# Patient Record
Sex: MALE | Race: BLACK OR AFRICAN AMERICAN | NOT HISPANIC OR LATINO | URBAN - METROPOLITAN AREA
[De-identification: names, ages, dates, MRNs, and addresses within clinical notes are randomized per-mention and may not be internally consistent; named-entity substitution may affect disease eponyms.]

---

## 2024-04-12 ENCOUNTER — HOSPITAL ENCOUNTER (OUTPATIENT)
Dept: RADIOLOGY | Facility: HOSPITAL | Age: 64
Discharge: HOME | End: 2024-04-12
Payer: COMMERCIAL

## 2024-04-12 DIAGNOSIS — M50.31 OTHER CERVICAL DISC DEGENERATION, HIGH CERVICAL REGION: ICD-10-CM

## 2024-04-12 DIAGNOSIS — M48.02 SPINAL STENOSIS, CERVICAL REGION: ICD-10-CM

## 2024-04-12 DIAGNOSIS — M54.81 OCCIPITAL NEURALGIA: ICD-10-CM

## 2024-04-12 PROCEDURE — 72141 MRI NECK SPINE W/O DYE: CPT

## 2024-04-12 PROCEDURE — 72141 MRI NECK SPINE W/O DYE: CPT | Performed by: RADIOLOGY

## 2024-07-29 ENCOUNTER — APPOINTMENT (OUTPATIENT)
Dept: NEUROSURGERY | Facility: CLINIC | Age: 64
End: 2024-07-29
Payer: COMMERCIAL

## 2024-07-29 VITALS
DIASTOLIC BLOOD PRESSURE: 84 MMHG | TEMPERATURE: 97.5 F | SYSTOLIC BLOOD PRESSURE: 151 MMHG | BODY MASS INDEX: 31.67 KG/M2 | HEART RATE: 71 BPM | WEIGHT: 213.85 LBS | HEIGHT: 69 IN

## 2024-07-29 DIAGNOSIS — M79.602 LEFT ARM PAIN: Primary | ICD-10-CM

## 2024-07-29 ASSESSMENT — ENCOUNTER SYMPTOMS
CONSTIPATION: 1
DIZZINESS: 1
ALLERGIC/IMMUNOLOGIC NEGATIVE: 1
LIGHT-HEADEDNESS: 1
NECK PAIN: 1
SHORTNESS OF BREATH: 1
OCCASIONAL FEELINGS OF UNSTEADINESS: 0
LOSS OF SENSATION IN FEET: 0
NUMBNESS: 1
CONSTITUTIONAL NEGATIVE: 1
HEADACHES: 1
PALPITATIONS: 1
BACK PAIN: 1
HEMATOLOGIC/LYMPHATIC NEGATIVE: 1
APNEA: 1
PSYCHIATRIC NEGATIVE: 1
WEAKNESS: 1
DEPRESSION: 0

## 2024-07-29 ASSESSMENT — PAIN SCALES - GENERAL: PAINLEVEL: 6

## 2024-07-29 NOTE — PROGRESS NOTES
"Here today for neck pain, was injured in 1999 and had a cervical fusion with Dr. Byrd. Today ache, stabbing and burning pain in the neck and down the left arm to the fingers. Has numbness and tingling. Has not had PT recently or injections. This has been going on for 3 months.    63-year-old man presents for evaluation of pain running from his neck down his left hand to his fingers.  His past medical history is significant for cervical fusion almost 20 years ago after an accident.  He has had neck pain on and off since then.  More recently however he developed pain that goes from the left paracentral region all the way down his arm to his fingers.  He cannot name which fingers.  The pain ranges from 5-10 out of 10 in severity.  He denies any symptoms in his right arm.    Review of Systems   Constitutional: Negative.    HENT:  Positive for hearing loss.    Eyes:  Positive for visual disturbance.   Respiratory:  Positive for apnea and shortness of breath.    Cardiovascular:  Positive for palpitations.   Gastrointestinal:  Positive for constipation.   Genitourinary: Negative.    Musculoskeletal:  Positive for back pain and neck pain.   Skin: Negative.    Allergic/Immunologic: Negative.    Neurological:  Positive for dizziness, weakness, light-headedness, numbness and headaches.   Hematological: Negative.    Psychiatric/Behavioral: Negative.         Visit Vitals  /84 (BP Location: Right arm, Patient Position: Sitting, BP Cuff Size: Adult)   Pulse 71   Temp 36.4 °C (97.5 °F)   Ht 1.753 m (5' 9\")   Wt 97 kg (213 lb 13.5 oz)   BMI 31.58 kg/m²   Smoking Status Never Assessed   BSA 2.17 m²         No current outpatient medications on file.      Objective   Neurological Exam    On physical exam, patient is alert and interactive.  Extraocular movements are intact.  Face moves symmetrically.  Strength is full throughout.  There is no evidence of hypertonia or hyperreflexia.  A Phalen sign is present to the left wrist " that reproduces the pain running through his entire left arm.    An MRI of the cervical spine that I personally reviewed demonstrates anterior fusion from C3-5.  There is mild to moderate stenosis at C2-3.  At C5-6 there is moderate bilateral foraminal stenosis.  C6-7 there is right foraminal stenosis.    While the patient has some degenerative spine disease, do not believe this explains his pattern of pain.  I would like to obtain a neuromuscular ultrasound the left upper extremity to look for any evidence of peripheral nerve entrapment as a cause of his symptoms.